# Patient Record
Sex: FEMALE | Race: WHITE
[De-identification: names, ages, dates, MRNs, and addresses within clinical notes are randomized per-mention and may not be internally consistent; named-entity substitution may affect disease eponyms.]

---

## 2022-09-01 ENCOUNTER — APPOINTMENT (OUTPATIENT)
Dept: ORTHOPEDIC SURGERY | Facility: CLINIC | Age: 57
End: 2022-09-01

## 2022-09-01 DIAGNOSIS — Z78.9 OTHER SPECIFIED HEALTH STATUS: ICD-10-CM

## 2022-09-01 PROBLEM — Z00.00 ENCOUNTER FOR PREVENTIVE HEALTH EXAMINATION: Status: ACTIVE | Noted: 2022-09-01

## 2022-09-01 PROCEDURE — 73080 X-RAY EXAM OF ELBOW: CPT | Mod: RT

## 2022-09-01 PROCEDURE — 99204 OFFICE O/P NEW MOD 45 MIN: CPT

## 2022-09-01 PROCEDURE — L3908: CPT

## 2022-09-01 RX ORDER — DICLOFENAC SODIUM 20 MG/G
2 SOLUTION TOPICAL
Qty: 1 | Refills: 2 | Status: ACTIVE | COMMUNITY
Start: 2022-09-01 | End: 1900-01-01

## 2022-09-01 RX ORDER — NAPROXEN 500 MG/1
500 TABLET ORAL TWICE DAILY
Qty: 30 | Refills: 0 | Status: ACTIVE | COMMUNITY
Start: 2022-09-01 | End: 1900-01-01

## 2022-09-02 NOTE — IMAGING
[Right] : right elbow [de-identified] : The patient is a well appearing 57 year old female of their stated age. \par Neck is supple & nontender to palpation. Negative Spurling's test. \par \par Right Shoulder: \par \par ROM: \par Forward Flexion: 180 degrees \par Abduction: 180 degrees \par ER at 90: 90 degrees \par IR at 90: 45 degrees \par ER at N: 50 degrees \par Motor: \par Abduction: 5 out of 5 \par FPS: 5 out of 5 \par Flexion: 5 out of 5 \par Internal Rotation: 5 out of 5 \par External Rotation: 5 out of 5 \par Provocative Testing: \par Impingement: Negative \par [place]'s: Negative \par Other: N/A \par \par Effected Elbow: RIGHT \par ROM: \par Flexion: 0-145 degrees \par Supination: 90 degrees \par Pronation: 90 degrees \par Inspection:\par Erythema: None \par Ecchymosis: None \par Abrasions: None \par Effusion: None \par Deformity: None \par Palpation: \par Crepitus: None \par Medial Epicondyle/Flexor-Pronator: Nontender \par Lateral Epicondyle/ECRB: tender \par Olecranon: Nontender \par Radial Head: Nontender \par Distal Biceps: Nontender \par Distal Triceps:  Nontender \par Flexor-Pronator: Nontender \par Extensor/ECRB: Nontender \par UCL: Nontender \par Pronator Intersection: Nontender \par Ulnar Nerve:  Stable & Nontender \par Stress Testing: \par Varus at 0 Degrees: Stable \par Varus at 30 Degrees: Stable \par Valgus at 0 Degrees: Stable \par Valgus at 30 Degrees: Stable \par Motor: \par Elbow Flexion: 5 out of 5 \par Elbow Extension: 5 out of 5 \par Supination: 5 out of 5, with pain \par Pronation: 5 out of 5 \par Wrist Flexion: 5 out of 5\par Wrist Extension: 5 out of 5 \par Interossei: 5 out of 5 \par : 5 out of 5 \par Provocative Testing: \par Milking: Negative \par Moving Valgus Stress: Negative \par Posterolateral R-I: Negative \par Hook Test: Negative\par Resisted Wrist Extension: with pain  \par Resisted Index Finger Extension: No Pain \par Resisted Middle Finger Extension: with pain \par Resisted Wrist Flexion: No Pain \par Resisted Pronation: No Pain \par Neurologic Exam: \par Axillary Nerve:  SLT \par Radial Nerve: SLT\par Median Nerve: SLT \par Ulnar Nerve:  SLT \par Other:  N/A \par Vascular Exam: \par Radial Pulse: 2+ \par Ulnar Pulse: 2+ \par Capillary Refill: <2 Seconds \par Other Exams: None \par \par Pertinent Contralateral Elbow Findings: None \par \par Assessment: The patient is a 57 year old woman with right arm pain and radiographic and physical exam findings consistent with lateral epicondylitis.    \par The patient’s condition is acute\par Documents/Results Reviewed Today: X ray right elbow\par Tests/Studies Independently Interpreted Today: X ray right elbow reveals small spur off lateral epicondyle \par Pertinent findings include: tender lateral epicondyle, pain with wrist extension, pain with extension of middle finger, pain with supination, 180/180/90/45/50, full cuff strength\par Confounding medical conditions/concerns: Allergic to penicillin\par \par Plan: Prescribed patient Pennsaid, Naprosyn, cock-up wrist splint, counterforce strap, and Reparel sleeve. Use medications as directed. Patient will start physical therapy. Modify activity as discussed. \par Tests Ordered: None \par Prescription Medications Ordered: Pennsaid and Naprosyn\par Braces/DME Ordered: Reparel sleeve, cock-up wrist splint, and counterforce strap \par Activity/Work/Sports Status: \par Additional Instructions: None\par Follow-Up: 6 weeks \par \par The patient's current medication management of their orthopedic diagnosis was reviewed today:\par (1) We discussed a comprehensive treatment plan that included possible pharmaceutical management involving the use of prescription strength medications including but not limited to options such as oral Naprosyn 500mg BID, once daily Meloxicam 15 mg, or 500-650 mg Tylenol versus over the counter oral medications and topical prescription NSAID Pennsaid vs over the counter Voltaren gel.\par (2) There is a moderate risk of morbidity with further treatment, especially from use of prescription strength medications and possible side effects of these medications which include upset stomach with oral medications, skin reactions to topical medications and cardiac/renal issues with long term use.\par (3) I recommended that the patient follow-up with their medical physician to discuss any significant specific potential issues with long term medication use such as interactions with current medications or with exacerbation of underlying medical comorbidities.\par (4) The benefits and risks associated with use of injectable, oral or topical, prescription and over the counter anti-inflammatory medications were discussed with the patient. The patient voiced understanding of the risks including but not limited to bleeding, stroke, kidney dysfunction, heart disease, and were referred to the black box warning label for further information.\par \par I, Martha Pal, attest that this documentation has been prepared under the direction and in the presence of Provider Dr. Sujit Victoria.\par \par \par The documentation recorded by the scribe accurately reflects the service I personally performed and the decisions made by me.\par The patient was seen by me under the direct supervision of Dr. Sujit Victoria\par  [FreeTextEntry1] : small spur off lateral epicondyle

## 2022-09-02 NOTE — HISTORY OF PRESENT ILLNESS
[de-identified] : The patient is a 57 year old right hand dominant female  who presents today complaining of right arm pain.  \par Date of Injury/Onset: 8/18/22\par Pain:    At Rest: 6/10 \par With Activity:  8/10 \par Mechanism of injury: Gradual increase in pain, no ROSELIA. \par This is not a Work Related Injury being treated under Worker's Compensation.\par This is not an athletic injury occurring associated with an interscholastic or organized sports team.\par Quality of symptoms: Burning and achey pain that started over lateral elbow and now radiates down into the forearm and up towards the shoulder\par Improves with: rest\par Worse with: typing, lifting, twisting motions\par Prior treatment: Massage therapy\par Prior Imaging: None\par Out of work/sport: [No], since [n/a]\par School/Sport/Position/Occupation: desk job\par Additional Information: [None]

## 2022-10-13 ENCOUNTER — APPOINTMENT (OUTPATIENT)
Dept: ORTHOPEDIC SURGERY | Facility: CLINIC | Age: 57
End: 2022-10-13
Payer: COMMERCIAL

## 2022-10-13 PROCEDURE — 99214 OFFICE O/P EST MOD 30 MIN: CPT

## 2022-10-13 PROCEDURE — 99213 OFFICE O/P EST LOW 20 MIN: CPT

## 2022-10-13 NOTE — HISTORY OF PRESENT ILLNESS
[de-identified] : The patient is a 57 year old right hand dominant female  who presents today complaining of right arm pain.  \par Date of Injury/Onset: 8/18/22\par Pain:  At Rest: 4/10 \par With Activity:  6/10 \par Mechanism of injury: Gradual increase in pain, no ROSELIA. \par This is not a Work Related Injury being treated under Worker's Compensation.\par This is not an athletic injury occurring associated with an interscholastic or organized sports team.\par Quality of symptoms: Burning and achey pain that started over lateral elbow and now radiates down into the forearm and up towards the shoulder\par Improves with: Reparel sleeve, Naproxen prn, went to PT for 1 week, Pennsaid was not covered by insurance\par Worse with: typing, lifting, twisting motions\par Treatment/Imaging/Studies Since Last Visit: None\par 	Reports Available For Review Today: None \par Out of work/sport: [No], since [n/a]\par School/Sport/Position/Occupation: desk job\par Additional Information: Pain that was radiating  to her shoulder is subsiding since wearing Reparel sleeve

## 2022-10-13 NOTE — IMAGING
[Right] : right elbow [FreeTextEntry1] : small spur off lateral epicondyle  [de-identified] : The patient is a well appearing 57 year old female of their stated age. \par Neck is supple & nontender to palpation. Negative Spurling's test. \par \par Right Shoulder: \par \par ROM: \par Forward Flexion: 180 degrees \par Abduction: 180 degrees \par ER at 90: 90 degrees \par IR at 90: 45 degrees \par ER at N: 50 degrees \par Motor: \par Abduction: 5 out of 5 \par FPS: 5 out of 5 \par Flexion: 5 out of 5 \par Internal Rotation: 5 out of 5 \par External Rotation: 5 out of 5 \par Provocative Testing: \par Impingement: Negative \par [place]'s: Negative \par Other: N/A \par \par Effected Elbow: RIGHT \par ROM: \par Flexion: 0-145 degrees \par Supination: 90 degrees \par Pronation: 90 degrees \par Inspection:\par Erythema: None \par Ecchymosis: None \par Abrasions: None \par Effusion: None \par Deformity: None \par Palpation: \par Crepitus: None \par Medial Epicondyle/Flexor-Pronator: Nontender \par Lateral Epicondyle/ECRB: TENDER\par Olecranon: Nontender \par Radial Head: Nontender \par Distal Biceps: Nontender \par Distal Triceps:  Nontender \par Flexor-Pronator: Nontender \par Extensor/ECRB: Nontender \par UCL: Nontender \par Pronator Intersection: Nontender \par Ulnar Nerve:  Stable & Nontender \par Stress Testing: \par Varus at 0 Degrees: Stable \par Varus at 30 Degrees: Stable \par Valgus at 0 Degrees: Stable \par Valgus at 30 Degrees: Stable \par Motor: \par Elbow Flexion: 5 out of 5 \par Elbow Extension: 5 out of 5 \par Supination: 5 out of 5\par Pronation: 5 out of 5 \par Wrist Flexion: 5 out of 5\par Wrist Extension: 5 out of 5\par Interossei: 5 out of 5 \par : 5 out of 5 \par Provocative Testing: \par Milking: Negative \par Moving Valgus Stress: Negative \par Posterolateral R-I: Negative \par Hook Test: Negative\par Resisted Wrist Extension: WITH PAIN  \par Resisted Index Finger Extension: No Pain \par Resisted Middle Finger Extension: No Pain  \par Resisted Wrist Flexion: No Pain \par Resisted Pronation: No Pain \par Neurologic Exam: \par Axillary Nerve:  SLT \par Radial Nerve: SLT\par Median Nerve: SLT \par Ulnar Nerve:  SLT \par Other:  N/A \par Vascular Exam: \par Radial Pulse: 2+ \par Ulnar Pulse: 2+ \par Capillary Refill: <2 Seconds \par Other Exams: None \par \par Pertinent Contralateral Elbow Findings: None \par \par Assessment: The patient is a 57 year old woman with right arm pain and radiographic and physical exam findings consistent with lateral epicondylitis.    \par The patient’s condition is acute\par Documents/Results Reviewed Today: None \par Tests/Studies Independently Interpreted Today: None\par Pertinent findings include: tender lateral epicondyle, pain with wrist extensionsupination\par Confounding medical conditions/concerns: Allergic to penicillin\par \par Plan: Patient will continue physical therapy, HEP, and stretching. Continue use of cock-up wrist splint, counterforce strap, and Reparel elbow sleeve. Prescribed Pennsaid for pain management - use as directed. Patient denies surgical intervention at this time, patient wishes to continue conservative treatment. Modify activity as discussed. \par Tests Ordered: None \par Prescription Medications Ordered: Pennsaid and Naprosyn\par Braces/DME Ordered: Reparel sleeve, cock-up wrist splint, and counterforce strap \par Activity/Work/Sports Status: \par Additional Instructions: None\par Follow-Up: 6 weeks \par \par The patient's current medication management of their orthopedic diagnosis was reviewed today:\par (1) We discussed a comprehensive treatment plan that included possible pharmaceutical management involving the use of prescription strength medications including but not limited to options such as oral Naprosyn 500mg BID, once daily Meloxicam 15 mg, or 500-650 mg Tylenol versus over the counter oral medications and topical prescription NSAID Pennsaid vs over the counter Voltaren gel.\par (2) There is a moderate risk of morbidity with further treatment, especially from use of prescription strength medications and possible side effects of these medications which include upset stomach with oral medications, skin reactions to topical medications and cardiac/renal issues with long term use.\par (3) I recommended that the patient follow-up with their medical physician to discuss any significant specific potential issues with long term medication use such as interactions with current medications or with exacerbation of underlying medical comorbidities.\par (4) The benefits and risks associated with use of injectable, oral or topical, prescription and over the counter anti-inflammatory medications were discussed with the patient. The patient voiced understanding of the risks including but not limited to bleeding, stroke, kidney dysfunction, heart disease, and were referred to the black box warning label for further information.\par \par I, Martha Pal, attest that this documentation has been prepared under the direction and in the presence of Provider Dr. Sujit Victoria.\par \par The documentation recorded by the scribe accurately reflects the service I personally performed and the decisions made by me.\par The patient was seen by me under the direct supervision of Dr. Sujit Victoria\par

## 2022-12-08 ENCOUNTER — APPOINTMENT (OUTPATIENT)
Dept: ORTHOPEDIC SURGERY | Facility: CLINIC | Age: 57
End: 2022-12-08

## 2022-12-08 VITALS — WEIGHT: 150 LBS | BODY MASS INDEX: 27.6 KG/M2 | HEIGHT: 62 IN

## 2022-12-08 DIAGNOSIS — M77.11 LATERAL EPICONDYLITIS, RIGHT ELBOW: ICD-10-CM

## 2022-12-08 PROCEDURE — 99213 OFFICE O/P EST LOW 20 MIN: CPT

## 2022-12-08 NOTE — IMAGING
[de-identified] : The patient is a well appearing 57 year old female of their stated age. \par Neck is supple & nontender to palpation. Negative Spurling's test. \par \par Right Shoulder: \par ROM: \par Forward Flexion: 180 degrees \par Abduction: 180 degrees \par ER at 90: 90 degrees \par IR at 90: 45 degrees \par ER at N: 50 degrees \par Motor: \par Abduction: 5 out of 5 \par FPS: 5 out of 5 \par Flexion: 5 out of 5 \par Internal Rotation: 5 out of 5 \par External Rotation: 5 out of 5 \par Provocative Testing: \par Impingement: Negative \par [place]'s: Negative \par Other: N/A \par \par Effected Elbow: RIGHT \par ROM: \par Flexion: 0-145 degrees \par Supination: 90 degrees \par Pronation: 90 degrees \par Inspection:\par Erythema: None \par Ecchymosis: None \par Abrasions: None \par Effusion: None \par Deformity: None \par Palpation: \par Crepitus: None \par Medial Epicondyle/Flexor-Pronator: Nontender \par Lateral Epicondyle/ECRB: TENDER\par Olecranon: Nontender \par Radial Head: Nontender \par Distal Biceps: Nontender \par Distal Triceps:  Nontender \par Flexor-Pronator: Nontender \par Extensor/ECRB: Nontender \par UCL: Nontender \par Pronator Intersection: Nontender \par Ulnar Nerve:  Stable & Nontender \par Stress Testing: \par Varus at 0 Degrees: Stable \par Varus at 30 Degrees: Stable \par Valgus at 0 Degrees: Stable \par Valgus at 30 Degrees: Stable \par Motor: \par Elbow Flexion: 5 out of 5 \par Elbow Extension: 5 out of 5 \par Supination: 5 out of 5\par Pronation: 5 out of 5 \par Wrist Flexion: 5 out of 5\par Wrist Extension: 5 out of 5\par Interossei: 5 out of 5 \par : 5 out of 5 \par Provocative Testing: \par Milking: Negative \par Moving Valgus Stress: Negative \par Posterolateral R-I: Negative \par Hook Test: Negative\par Resisted Wrist Extension: WITH PAIN  \par Resisted Index Finger Extension: No Pain \par Resisted Middle Finger Extension: No Pain  \par Resisted Wrist Flexion: No Pain \par Resisted Pronation: No Pain \par Neurologic Exam: \par Axillary Nerve:  SLT \par Radial Nerve: SLT\par Median Nerve: SLT \par Ulnar Nerve:  SLT \par Other:  N/A \par Vascular Exam: \par Radial Pulse: 2+ \par Ulnar Pulse: 2+ \par Capillary Refill: <2 Seconds \par Other Exams: None \par \par Pertinent Contralateral Elbow Findings: None \par \par Assessment: The patient is a 57 year old woman with right arm pain and radiographic and physical exam findings consistent with lateral epicondylitis.    \par The patient’s condition is acute\par Documents/Results Reviewed Today: None \par Tests/Studies Independently Interpreted Today: None\par Pertinent findings include: tender lateral epicondyle, pain with wrist extension and supination\par Confounding medical conditions/concerns: Allergic to penicillin\par \par Plan: Patient will continue physical therapy, HEP, and stretching. Continue use of cock-up wrist splint, counterforce strap, and Reparel elbow sleeve. Patient denies surgical intervention at this time, patient wishes to continue conservative treatment. Discussed further conservative treatments in the form of injections. Patient declines any injection at this time. Provided patient with a massage therapist per request. Modify activity as discussed. \par Tests Ordered: None \par Prescription Medications Ordered: None\par Braces/DME Ordered: None  \par Activity/Work/Sports Status: \par Additional Instructions: None\par Follow-Up: 6 weeks \par \par The patient's current medication management of their orthopedic diagnosis was reviewed today:\par (1) We discussed a comprehensive treatment plan that included possible pharmaceutical management involving the use of prescription strength medications including but not limited to options such as oral Naprosyn 500mg BID, once daily Meloxicam 15 mg, or 500-650 mg Tylenol versus over the counter oral medications and topical prescription NSAID Pennsaid vs over the counter Voltaren gel.\par (2) There is a moderate risk of morbidity with further treatment, especially from use of prescription strength medications and possible side effects of these medications which include upset stomach with oral medications, skin reactions to topical medications and cardiac/renal issues with long term use.\par (3) I recommended that the patient follow-up with their medical physician to discuss any significant specific potential issues with long term medication use such as interactions with current medications or with exacerbation of underlying medical comorbidities.\par (4) The benefits and risks associated with use of injectable, oral or topical, prescription and over the counter anti-inflammatory medications were discussed with the patient. The patient voiced understanding of the risks including but not limited to bleeding, stroke, kidney dysfunction, heart disease, and were referred to the black box warning label for further information.\par \par I, Martha Pal, attest that this documentation has been prepared under the direction and in the presence of Provider Dr. Sujit Victoria.\par \par \par The documentation recorded by the scribe accurately reflects the service I personally performed and the decisions made by me.\par

## 2022-12-08 NOTE — HISTORY OF PRESENT ILLNESS
[de-identified] : The patient is a 57 year old right hand dominant female  who presents today complaining of right arm pain.  \par Date of Injury/Onset: 8/18/22\par Pain:  At Rest: 3/10 \par With Activity:  7-8/10 \par Mechanism of injury: Gradual increase in pain, no ROSELIA. \par This is not a Work Related Injury being treated under Worker's Compensation.\par This is not an athletic injury occurring associated with an interscholastic or organized sports team.\par Quality of symptoms: Burning and achey pain that started over lateral elbow and now radiates down into the forearm and up towards the shoulder\par Improves with: Reparel sleeve, Naproxen prn, \par Worse with: typing, lifting, twisting motions\par Treatment/Imaging/Studies Since Last Visit: None\par 	Reports Available For Review Today: None \par Out of work/sport: [No], since [n/a]\par School/Sport/Position/Occupation: desk job\par changes since last visit: Patient states that pain is slightly better. Still experiences radiating pain into shoulder intermittently. Not going to PT. doing her own exercises at gym\par Additional Information: none

## 2023-01-19 ENCOUNTER — APPOINTMENT (OUTPATIENT)
Dept: ORTHOPEDIC SURGERY | Facility: CLINIC | Age: 58
End: 2023-01-19